# Patient Record
Sex: MALE | Race: WHITE
[De-identification: names, ages, dates, MRNs, and addresses within clinical notes are randomized per-mention and may not be internally consistent; named-entity substitution may affect disease eponyms.]

---

## 2017-01-10 PROBLEM — Z00.00 ENCOUNTER FOR PREVENTIVE HEALTH EXAMINATION: Status: ACTIVE | Noted: 2017-01-10

## 2017-01-17 ENCOUNTER — APPOINTMENT (OUTPATIENT)
Dept: SURGERY | Facility: CLINIC | Age: 42
End: 2017-01-17

## 2017-01-17 VITALS
WEIGHT: 315 LBS | HEIGHT: 74 IN | OXYGEN SATURATION: 98 % | BODY MASS INDEX: 40.43 KG/M2 | RESPIRATION RATE: 16 BRPM | TEMPERATURE: 97.6 F | HEART RATE: 75 BPM | SYSTOLIC BLOOD PRESSURE: 168 MMHG | DIASTOLIC BLOOD PRESSURE: 90 MMHG

## 2017-01-17 DIAGNOSIS — Z78.9 OTHER SPECIFIED HEALTH STATUS: ICD-10-CM

## 2017-01-17 DIAGNOSIS — Z82.49 FAMILY HISTORY OF ISCHEMIC HEART DISEASE AND OTHER DISEASES OF THE CIRCULATORY SYSTEM: ICD-10-CM

## 2017-01-24 ENCOUNTER — OUTPATIENT (OUTPATIENT)
Dept: OUTPATIENT SERVICES | Facility: HOSPITAL | Age: 42
LOS: 1 days | Discharge: HOME | End: 2017-01-24

## 2017-01-24 VITALS
HEART RATE: 82 BPM | RESPIRATION RATE: 18 BRPM | OXYGEN SATURATION: 96 % | WEIGHT: 315 LBS | DIASTOLIC BLOOD PRESSURE: 86 MMHG | TEMPERATURE: 98 F | HEIGHT: 74 IN | SYSTOLIC BLOOD PRESSURE: 153 MMHG

## 2017-01-24 DIAGNOSIS — Z98.890 OTHER SPECIFIED POSTPROCEDURAL STATES: Chronic | ICD-10-CM

## 2017-01-24 DIAGNOSIS — D17.30 BENIGN LIPOMATOUS NEOPLASM OF SKIN AND SUBCUTANEOUS TISSUE OF UNSPECIFIED SITES: ICD-10-CM

## 2017-01-24 DIAGNOSIS — D17.9 BENIGN LIPOMATOUS NEOPLASM, UNSPECIFIED: ICD-10-CM

## 2017-01-24 DIAGNOSIS — Z01.818 ENCOUNTER FOR OTHER PREPROCEDURAL EXAMINATION: ICD-10-CM

## 2017-01-24 DIAGNOSIS — E66.9 OBESITY, UNSPECIFIED: ICD-10-CM

## 2017-01-24 LAB
ANION GAP SERPL CALC-SCNC: 9 MMOL/L — SIGNIFICANT CHANGE UP (ref 5–17)
BASOPHILS # BLD AUTO: 0.2 K/UL — SIGNIFICANT CHANGE UP (ref 0–0.2)
BASOPHILS NFR BLD AUTO: 1.7 % — SIGNIFICANT CHANGE UP (ref 0–2)
BUN SERPL-MCNC: 10 MG/DL — SIGNIFICANT CHANGE UP (ref 7–23)
CALCIUM SERPL-MCNC: 8.5 MG/DL — SIGNIFICANT CHANGE UP (ref 8.5–10.1)
CHLORIDE SERPL-SCNC: 102 MMOL/L — SIGNIFICANT CHANGE UP (ref 96–108)
CO2 SERPL-SCNC: 30 MMOL/L — SIGNIFICANT CHANGE UP (ref 22–31)
CREAT SERPL-MCNC: 1.09 MG/DL — SIGNIFICANT CHANGE UP (ref 0.5–1.3)
EOSINOPHIL # BLD AUTO: 0.3 K/UL — SIGNIFICANT CHANGE UP (ref 0–0.5)
EOSINOPHIL NFR BLD AUTO: 3 % — SIGNIFICANT CHANGE UP (ref 0–6)
GLUCOSE SERPL-MCNC: 113 MG/DL — HIGH (ref 70–99)
HCT VFR BLD CALC: 44.2 % — SIGNIFICANT CHANGE UP (ref 39–50)
HGB BLD-MCNC: 16 G/DL — SIGNIFICANT CHANGE UP (ref 13–17)
LYMPHOCYTES # BLD AUTO: 27.5 % — SIGNIFICANT CHANGE UP (ref 13–44)
LYMPHOCYTES # BLD AUTO: 3 K/UL — SIGNIFICANT CHANGE UP (ref 1–3.3)
MCHC RBC-ENTMCNC: 30.7 PG — SIGNIFICANT CHANGE UP (ref 27–34)
MCHC RBC-ENTMCNC: 36.2 GM/DL — HIGH (ref 32–36)
MCV RBC AUTO: 84.8 FL — SIGNIFICANT CHANGE UP (ref 80–100)
MONOCYTES # BLD AUTO: 0.7 K/UL — SIGNIFICANT CHANGE UP (ref 0–0.9)
MONOCYTES NFR BLD AUTO: 6 % — SIGNIFICANT CHANGE UP (ref 2–14)
NEUTROPHILS # BLD AUTO: 6.8 K/UL — SIGNIFICANT CHANGE UP (ref 1.8–7.4)
NEUTROPHILS NFR BLD AUTO: 61.9 % — SIGNIFICANT CHANGE UP (ref 43–77)
PLATELET # BLD AUTO: 280 K/UL — SIGNIFICANT CHANGE UP (ref 150–400)
POTASSIUM SERPL-MCNC: 3.4 MMOL/L — LOW (ref 3.5–5.3)
POTASSIUM SERPL-SCNC: 3.4 MMOL/L — LOW (ref 3.5–5.3)
RBC # BLD: 5.21 M/UL — SIGNIFICANT CHANGE UP (ref 4.2–5.8)
RBC # FLD: 11.6 % — SIGNIFICANT CHANGE UP (ref 11–15)
SODIUM SERPL-SCNC: 141 MMOL/L — SIGNIFICANT CHANGE UP (ref 135–145)
WBC # BLD: 10.9 K/UL — HIGH (ref 3.8–10.5)
WBC # FLD AUTO: 10.9 K/UL — HIGH (ref 3.8–10.5)

## 2017-01-24 NOTE — H&P PST ADULT - ATTENDING COMMENTS
42 y.o. male with increasingly prominent (secondary to recent weight loss) but stable benign-appearing soft tissue mass right lateral neck. For excision. R/B/A explained.

## 2017-01-26 ENCOUNTER — RESULT REVIEW (OUTPATIENT)
Age: 42
End: 2017-01-26

## 2017-01-27 ENCOUNTER — TRANSCRIPTION ENCOUNTER (OUTPATIENT)
Age: 42
End: 2017-01-27

## 2017-01-27 ENCOUNTER — APPOINTMENT (OUTPATIENT)
Dept: SURGERY | Facility: HOSPITAL | Age: 42
End: 2017-01-27

## 2017-01-27 ENCOUNTER — OUTPATIENT (OUTPATIENT)
Dept: OUTPATIENT SERVICES | Facility: HOSPITAL | Age: 42
LOS: 1 days | Discharge: HOME | End: 2017-01-27
Payer: COMMERCIAL

## 2017-01-27 VITALS
HEART RATE: 82 BPM | DIASTOLIC BLOOD PRESSURE: 51 MMHG | RESPIRATION RATE: 18 BRPM | SYSTOLIC BLOOD PRESSURE: 147 MMHG | OXYGEN SATURATION: 97 %

## 2017-01-27 VITALS — HEIGHT: 74 IN | WEIGHT: 315 LBS

## 2017-01-27 DIAGNOSIS — Z98.890 OTHER SPECIFIED POSTPROCEDURAL STATES: Chronic | ICD-10-CM

## 2017-01-27 PROCEDURE — 88304 TISSUE EXAM BY PATHOLOGIST: CPT | Mod: 26

## 2017-01-27 RX ORDER — MORPHINE SULFATE 50 MG/1
4 CAPSULE, EXTENDED RELEASE ORAL ONCE
Qty: 0 | Refills: 0 | Status: DISCONTINUED | OUTPATIENT
Start: 2017-01-27 | End: 2017-02-11

## 2017-01-27 RX ORDER — SODIUM CHLORIDE 9 MG/ML
3 INJECTION INTRAMUSCULAR; INTRAVENOUS; SUBCUTANEOUS EVERY 8 HOURS
Qty: 0 | Refills: 0 | Status: DISCONTINUED | OUTPATIENT
Start: 2017-01-27 | End: 2017-01-27

## 2017-01-27 RX ORDER — SODIUM CHLORIDE 9 MG/ML
1000 INJECTION, SOLUTION INTRAVENOUS
Qty: 0 | Refills: 0 | Status: DISCONTINUED | OUTPATIENT
Start: 2017-01-27 | End: 2017-02-11

## 2017-01-27 RX ADMIN — SODIUM CHLORIDE 75 MILLILITER(S): 9 INJECTION, SOLUTION INTRAVENOUS at 15:22

## 2017-01-27 NOTE — ASU DISCHARGE PLAN (ADULT/PEDIATRIC). - SPECIAL INSTRUCTIONS
Shower in 1 days with wound clean dry.  Diet as tolerated. Call the doctor if any questions or concerns.

## 2017-01-27 NOTE — ASU DISCHARGE PLAN (ADULT/PEDIATRIC). - NOTIFY
Persistent Nausea and Vomiting/Swelling that continues/Fever greater than 101/Pain not relieved by Medications/Bleeding that does not stop/Inability to Tolerate Liquids or Foods

## 2017-01-31 PROBLEM — E66.9 OBESITY, UNSPECIFIED: Chronic | Status: ACTIVE | Noted: 2017-01-24

## 2017-02-01 LAB — SURGICAL PATHOLOGY FINAL REPORT - CH: SIGNIFICANT CHANGE UP

## 2017-02-02 DIAGNOSIS — D17.0 BENIGN LIPOMATOUS NEOPLASM OF SKIN AND SUBCUTANEOUS TISSUE OF HEAD, FACE AND NECK: ICD-10-CM

## 2017-02-02 DIAGNOSIS — Z91.013 ALLERGY TO SEAFOOD: ICD-10-CM

## 2017-02-02 DIAGNOSIS — E66.9 OBESITY, UNSPECIFIED: ICD-10-CM

## 2017-02-03 ENCOUNTER — APPOINTMENT (OUTPATIENT)
Dept: SURGERY | Facility: HOSPITAL | Age: 42
End: 2017-02-03

## 2017-02-14 ENCOUNTER — APPOINTMENT (OUTPATIENT)
Dept: SURGERY | Facility: CLINIC | Age: 42
End: 2017-02-14

## 2017-02-14 VITALS
DIASTOLIC BLOOD PRESSURE: 105 MMHG | OXYGEN SATURATION: 97 % | HEART RATE: 84 BPM | RESPIRATION RATE: 16 BRPM | SYSTOLIC BLOOD PRESSURE: 185 MMHG | TEMPERATURE: 97.4 F

## 2017-02-14 DIAGNOSIS — Z86.018 OTHER SPECIFIED POSTPROCEDURAL STATES: ICD-10-CM

## 2017-02-14 DIAGNOSIS — D17.30 BENIGN LIPOMATOUS NEOPLASM OF SKIN AND SUBCUTANEOUS TISSUE OF UNSPECIFIED SITES: ICD-10-CM

## 2017-02-14 DIAGNOSIS — Z98.890 OTHER SPECIFIED POSTPROCEDURAL STATES: ICD-10-CM

## 2017-02-14 RX ORDER — OXYCODONE AND ACETAMINOPHEN 5; 325 MG/1; MG/1
5-325 TABLET ORAL EVERY 4 HOURS
Qty: 32 | Refills: 0 | Status: DISCONTINUED | COMMUNITY
Start: 2017-01-27 | End: 2017-02-14

## 2019-10-09 NOTE — H&P PST ADULT - HEMATOLOGY/LYMPHATICS
negative

## 2020-12-30 NOTE — ASU PREOP CHECKLIST - NSWEIGHTCALCTOOLDRUG_GEN_A_CORE
Discharge Instructions    Discharged to other by medical transportation with escort. Discharged via wheelchair, hospital escort: Yes.  Special equipment needed: TLSO    Be sure to schedule a follow-up appointment with your primary care doctor or any specialists as instructed.     Discharge Plan:   Diet Plan: Discussed  Activity Level: Discussed  Confirmed Follow up Appointment: Patient to Call and Schedule Appointment  Confirmed Symptoms Management: Discussed  Medication Reconciliation Updated: Yes  Influenza Vaccine Indication: Not indicated: Previously immunized this influenza season and > 8 years of age    I understand that a diet low in cholesterol, fat, and sodium is recommended for good health. Unless I have been given specific instructions below for another diet, I accept this instruction as my diet prescription.   Other diet: Regular     Special Instructions  F/u with Dr. Martinez  F/u with Dr. Keane if back pain not improved  F/u with urologist regarding chronic Macrobid prescription  TLSO for 3-6 weeks  Weight limit of 1 pound for right hand    · Is patient discharged on Warfarin / Coumadin?   No        Wrist Fracture Treated With ORIF, Care After  This sheet gives you information about how to care for yourself after your procedure. Your health care provider may also give you more specific instructions. If you have problems or questions, contact your health care provider.  What can I expect after the procedure?  After the procedure, it is common to have:  · Pain.  · Swelling.  · Stiffness.  · A small amount of drainage from the incision.  Follow these instructions at home:  If you have a cast:  · Do not stick anything inside the cast to scratch your skin. Doing that increases your risk of infection.  · Check the skin around the cast every day. Tell your health care provider about any concerns.  · You may put lotion on dry skin around the edges of the cast. Do not put lotion on the skin underneath the  cast.  · Keep the cast clean and dry.  If you have a splint or sling:  · Wear the splint or sling as told by your health care provider. Remove it only as told by your health care provider.  · Loosen the splint or sling if your fingers tingle, become numb, or turn cold and blue.  · Keep the splint or sling clean and dry.  Bathing  · Do not take baths, swim, or use a hot tub until your health care provider approves. Ask your health care provider if you may take showers. You may only be allowed to take sponge baths.  · If your cast, splint, or sling is not waterproof:  ? Do not let it get wet.  ? Cover it with a watertight covering when you take a bath or shower.  · If you have a sling, remove it for bathing only if your health care provider tells you that it is safe to do so.  · Keep the bandage (dressing) dry until your health care provider says it can be removed.  Incision care    · Follow instructions from your health care provider about how to take care of your incision. Make sure you:  ? Wash your hands with soap and water before you change your bandage (dressing). If soap and water are not available, use hand .  ? Change your dressing as told by your health care provider.  ? Leave stitches (sutures), skin glue, or adhesive strips in place. These skin closures may need to stay in place for 2 weeks or longer. If adhesive strip edges start to loosen and curl up, you may trim the loose edges. Do not remove adhesive strips completely unless your health care provider tells you to do that.  · Check your incision area every day for signs of infection. Check for:  ? Redness.  ? More swelling or pain.  ? Blood or more fluid.  ? Warmth.  ? Pus or a bad smell.  Managing pain, stiffness, and swelling    · If directed, put ice on the injured area.  ? If you have a removable splint or sling, remove it as told by your health care provider.  ? Put ice in a plastic bag.  ? Place a towel between your skin and the bag or  between your cast and the bag.  ? Leave the ice on for 20 minutes, 2-3 times a day.  · Move your fingers often to avoid stiffness and to lessen swelling.  · Raise (elevate) the injured area above the level of your heart while you are sitting or lying down.  Driving  · Do not drive or use heavy machinery while taking prescription pain medicine.  · Do not drive for 24 hours if you were given a sedative during your procedure.  · Ask your health care provider when it is safe to drive if you have a cast, splint, or sling on your wrist.  Activity  · Return to your normal activities as told by your health care provider. Ask your health care provider what activities are safe for you.  · Do exercises as told by your health care provider.  · Do not lift with your injured wrist until your health care provider approves.  · Avoid pulling and pushing.  General instructions  · Do not put pressure on any part of the cast or splint until it is fully hardened. This may take several hours.  · Take over-the-counter and prescription medicines only as told by your health care provider.  · Do not use any products that contain nicotine or tobacco, such as cigarettes and e-cigarettes. These can delay bone healing after surgery. If you need help quitting, ask your health care provider.  · If you were prescribed pain medicine, take steps to prevent or treat constipation. Your health care provider may recommend that you:  ? Drink enough fluid to keep your urine pale yellow.  ? Take over-the-counter or prescription medicines.  ? Eat foods that are high in fiber, such as beans, whole grains, and fresh fruits and vegetables.  ? Limit foods that are high in fat and processed sugars, such as fried or sweet foods.  · Keep all follow-up visits as told by your health care provider. This is important.  Contact a health care provider if:  · Your cast, splint, or sling is damaged or loose.  · Your pain is not controlled with medicine.  · You have a  fever.  · You have redness around your incision.  · You have more swelling or pain around your incision.  · You have blood or more fluid coming from your incision.  · Your incision feels warm to the touch.  · You have pus or a bad smell coming from your incision or your dressing.  · You develop a rash.  Get help right away if:  · Your skin or fingers on your injured arm turn blue or gray.  · Your arm feels cold or numb.  · You have severe pain in your injured wrist.  · You have trouble breathing.  · You feel faint or light-headed.  Summary  · After the procedure, it is common to have pain, swelling, stiffness, and a small amount of drainage from the incision.  · You may use ice, elevation, and pain medicine as told by your health care provider to lessen pain and swelling.  · Wear your splint or sling as told by your health care provider.  · Do not lift with your injured wrist until your health care provider approves.  This information is not intended to replace advice given to you by your health care provider. Make sure you discuss any questions you have with your health care provider.  Document Released: 11/28/2016 Document Revised: 05/07/2019 Document Reviewed: 05/07/2019  [employer] Patient Education © 2020 [employer]        Lumbar Spine Fracture  A lumbar spine fracture is a break in one of the bones of the lower back. Lumbar spine fractures can vary from mild to severe. The most severe types are those that:  · Cause the broken bones to move out of place (unstable).  · Injure or press on the spinal cord.  During recovery, it is normal to have pain and stiffness in the lower back for weeks.  What are the causes?  This condition may be caused by:  · A fall.  · A car accident.  · A gunshot wound.  · A hard, direct hit to the back.  What increases the risk?  You are more likely to develop this condition if:  · You are in a situation that could result in a fall or other violent injury.  · You have a condition that  causes weakness in the bones (osteoporosis).  What are the signs or symptoms?  The main symptom of this condition is severe pain in the lower back. If a fracture is complex or severe, there may also be:  · A misshapen or swollen area on the lower back.  · Limited ability to move an area of the lower back.  · Inability to empty the bladder (urinary retention).  · Loss of bowel or bladder control (incontinence).  · Loss of strength or sensation in the legs, feet, and toes.  · Inability to move (paralysis).  How is this diagnosed?  This condition is diagnosed based on:  · A physical exam.  · Symptoms and what happened just before they developed.  · The results of imaging tests, such as an X-ray, CT scan, or MRI.  If your nerves have been damaged, you may also have other tests to find out the extent of the damage.  How is this treated?  Treatment for this condition depends on how severe the injury is. Most fractures can be treated with:  · A back brace.  · Bed rest and activity restrictions.  · Pain medicine.  · Physical therapy.  Fractures that are complex, involve multiple bones, or make the spine unstable may require surgery. Surgery is done:  · To remove pressure from the nerves or spinal cord.  · To stabilize the broken pieces of bone.  Follow these instructions at home:  Medicines  · Take over-the-counter and prescription medicines only as told by your health care provider.  · Do not drive or use heavy machinery while taking prescription pain medicine.  · If you are taking prescription pain medicine, take actions to prevent or treat constipation. Your health care provider may recommend that you:  ? Drink enough fluid to keep your urine pale yellow.  ? Eat foods that are high in fiber, such as fresh fruits and vegetables, whole grains, and beans.  ? Limit foods that are high in fat and processed sugars, such as fried or sweet foods.  ? Take an over-the-counter or prescription medicine for constipation.  If you have  a brace:  · Wear the back brace as told by your health care provider. Remove it only as told by your health care provider.  · Keep the brace clean.  · If the brace is not waterproof:  ? Do not let it get wet.  ? Cover it with a watertight covering when you take a bath or a shower.  Activity  · Stay in bed (on bed rest) only as directed by your health care provider.  · Do exercises to improve motion and strength in your back (physical therapy), if your health care provider tells you to do so.  · Return to your normal activities as directed by your health care provider. Ask your health care provider what activities are safe for you.  Managing pain, stiffness, and swelling    · If directed, put ice on the injured area:  ? If you have a removable brace, remove it as told by your health care provider.  ? Put ice in a plastic bag.  ? Place a towel between your skin and the bag.  ? Leave the ice on for 20 minutes, 2-3 times a day.  General instructions  · Do not use any products that contain nicotine or tobacco, such as cigarettes and e-cigarettes. These can delay healing after injury. If you need help quitting, ask your health care provider.  · Do not drink alcohol. Alcohol can interfere with your treatment.  · Keep all follow-up visits as directed by your health care provider. This is important.  ? Failing to follow up as recommended could result in permanent injury, disability, or long-lasting (chronic) pain.  Contact a health care provider if:  · You have a fever.  · Your pain medicine is not helping.  · Your pain does not get better over time.  · You cannot return to your normal activities as planned or expected.  Get help right away if:  · You have difficulty breathing.  · Your pain is very bad and it suddenly gets worse.  · You have numbness, tingling, or weakness in any part of your body.  · You are unable to empty your bladder.  · You cannot control your bladder or bowels.  · You are unable to move any body part  (paralysis) that is below the level of your injury.  · You vomit.  · You have pain in your abdomen.  Summary  · A lumbar spine fracture is a break in one of the bones of the lower back.  · The main symptom of this condition is severe pain in the lower back. If a fracture is complex, there may also be numbness, tingling, or paralysis in the legs.  · Treatment depends on how severe the injury is. Most fractures can be treated with a back brace, bed rest and activity restrictions, pain medicine, and physical therapy.  · Fractures that are complex, involve multiple bones, or make the spine unstable may require surgery.  This information is not intended to replace advice given to you by your health care provider. Make sure you discuss any questions you have with your health care provider.  Document Released: 04/03/2008 Document Revised: 02/02/2019 Document Reviewed: 02/02/2019  [employer] Patient Education © 2020 [employer]      How To Use an Incentive Spirometer  An incentive spirometer is a tool that measures how well you are filling your lungs with each breath. Learning to take long, deep breaths using this tool can help you keep your lungs clear and active. This may help to reverse or lessen your chance of developing breathing (pulmonary) problems, especially infection. You may be asked to use a spirometer:  · After a surgery.  · If you have a lung problem or a history of smoking.  · After a long period of time when you have been unable to move or be active.  If the spirometer includes an indicator to show the highest number that you have reached, your health care provider or respiratory therapist will help you set a goal. Keep a list (log) of your progress as told by your health care provider.  What are the risks?  · Breathing too quickly may cause dizziness or cause you to pass out. Take your time so you do not get dizzy or light-headed.  · If you are in pain, you may need to take pain medicine before doing  incentive spirometry. It is harder to take a deep breath if you are having pain.  How to use your incentive spirometer    1. Sit up on the edge of your bed or on a chair.  2. Hold the incentive spirometer so that it is in an upright position.  3. Before you use the spirometer, breathe out normally.  4. Place the mouthpiece in your mouth. Make sure your lips are closed tightly around it.  5. Breathe in slowly and as deeply as you can through your mouth, causing the piston or the ball to rise toward the top of the chamber.  6. Hold your breath for 3-5 seconds, or for as long as possible.  ? If the spirometer includes a  indicator, use this to guide you in breathing. Slow down your breathing if the indicator goes above the marked areas.  7. Remove the mouthpiece from your mouth and breathe out normally. The piston or ball will return to the bottom of the chamber.  8. Rest for a few seconds, then repeat the steps 10 or more times.  ? Take your time and take a few normal breaths between deep breaths so that you do not get dizzy or light-headed.  ? Do this every 1-2 hours when you are awake.  9. If the spirometer includes a goal marker to show the highest number you have reached (best effort), use this as a goal to work toward during each repetition.  10. After each set of 10 deep breaths, cough a few times. This will help to make sure that your lungs are clear.  ? If you have an incision on your chest or abdomen from surgery, place a pillow or a rolled-up towel firmly against the incision when you cough. This can help to reduce pain from coughing.  General tips  · When you become able to get out of bed, walk around often and continue to cough to help clear your lungs.  · Keep using the incentive spirometer until your health care provider says it is okay to stop using it. If you have been in the hospital, you may be told to keep using the spirometer at home.  Contact a health care provider if:  · You are having  difficulty using the spirometer.  · You have trouble using the spirometer as often as instructed.  · Your pain medicine is not giving enough relief for you to use the spirometer as told.  · You have a fever.  · You develop shortness of breath.  Get help right away if:  · You develop a cough with bloody mucus from the lungs (bloody sputum).  · You have fluid or blood coming from an incision site after you cough.  Summary  · An incentive spirometer is a tool that can help you learn to take long, deep breaths to keep your lungs clear and active.  · You may be asked to use a spirometer after a surgery, if you have a lung problem or a history of smoking, or if you have been inactive for a long period of time.  · Use your incentive spirometer as instructed every 1-2 hours while you are awake.  · If you have an incision on your chest or abdomen, place a pillow or a rolled-up towel firmly against your incision when you cough. This will help to reduce pain.  This information is not intended to replace advice given to you by your health care provider. Make sure you discuss any questions you have with your health care provider.  Document Released: 04/29/2008 Document Revised: 01/10/2019 Document Reviewed: 10/31/2018  [employer] Patient Education © 2020 "[employer]    Depression / Suicide Risk    As you are discharged from this [facility] facility, it is important to learn how to keep safe from harming yourself.    Recognize the warning signs:  · Abrupt changes in personality, positive or negative- including increase in energy   · Giving away possessions  · Change in eating patterns- significant weight changes-  positive or negative  · Change in sleeping patterns- unable to sleep or sleeping all the time   · Unwillingness or inability to communicate  · Depression  · Unusual sadness, discouragement and loneliness  · Talk of wanting to die  · Neglect of personal appearance   · Rebelliousness- reckless behavior  · Withdrawal  from people/activities they love  · Confusion- inability to concentrate     If you or a loved one observes any of these behaviors or has concerns about self-harm, here's what you can do:  · Talk about it- your feelings and reasons for harming yourself  · Remove any means that you might use to hurt yourself (examples: pills, rope, extension cords, firearm)  · Get professional help from the community (Mental Health, Substance Abuse, psychological counseling)  · Do not be alone:Call your Safe Contact- someone whom you trust who will be there for you.  · Call your local CRISIS HOTLINE 355-1407 or 732-013-7698  · Call your local Children's Mobile Crisis Response Team [place] (547) 428-2095 or www.[employer]  · Call the toll free National Suicide Prevention Hotlines   · National Suicide Prevention Lifeline 403-410-GIQT (1233)  · National Hope Line Network 800-SUICIDE (237-7492)        used